# Patient Record
Sex: MALE | Race: WHITE | NOT HISPANIC OR LATINO | URBAN - METROPOLITAN AREA
[De-identification: names, ages, dates, MRNs, and addresses within clinical notes are randomized per-mention and may not be internally consistent; named-entity substitution may affect disease eponyms.]

---

## 2019-06-26 ENCOUNTER — EMERGENCY (EMERGENCY)
Facility: HOSPITAL | Age: 69
LOS: 1 days | Discharge: HOME | End: 2019-06-26
Admitting: EMERGENCY MEDICINE
Payer: MEDICARE

## 2019-06-26 VITALS
HEART RATE: 64 BPM | SYSTOLIC BLOOD PRESSURE: 163 MMHG | WEIGHT: 225.09 LBS | TEMPERATURE: 98 F | OXYGEN SATURATION: 99 % | RESPIRATION RATE: 20 BRPM | DIASTOLIC BLOOD PRESSURE: 99 MMHG

## 2019-06-26 VITALS
SYSTOLIC BLOOD PRESSURE: 125 MMHG | DIASTOLIC BLOOD PRESSURE: 74 MMHG | RESPIRATION RATE: 18 BRPM | OXYGEN SATURATION: 98 % | HEART RATE: 62 BPM | TEMPERATURE: 98 F

## 2019-06-26 DIAGNOSIS — S86.011A STRAIN OF RIGHT ACHILLES TENDON, INITIAL ENCOUNTER: ICD-10-CM
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
## 2019-06-26 DIAGNOSIS — M25.571 PAIN IN RIGHT ANKLE AND JOINTS OF RIGHT FOOT: ICD-10-CM
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
## 2019-06-26 PROBLEM — Z00.00 ENCOUNTER FOR PREVENTIVE HEALTH EXAMINATION: Status: ACTIVE | Noted: 2019-06-26

## 2019-06-26 PROCEDURE — 99283 EMERGENCY DEPT VISIT LOW MDM: CPT

## 2019-06-26 PROCEDURE — 73610 X-RAY EXAM OF ANKLE: CPT | Mod: 26,RT

## 2019-06-26 PROCEDURE — 99284 EMERGENCY DEPT VISIT MOD MDM: CPT

## 2019-06-26 NOTE — ED PROVIDER NOTE - MUSCULOSKELETAL MINIMAL EXAM
R ankle:  no midfoot or malleolar TTP, no ecchymosis, no edema, no deformity.  + TTP to achilles, 2+ pedal pulses.  brisk cap refill.  + Persaud test.

## 2019-06-26 NOTE — ED PROVIDER NOTE - CARE PROVIDER_API CALL
Camron Royal)  [facility]  Orthopedics  [address], [address] [place] [address]  Phone: [phone]  Fax: [phone]  Follow Up Time:

## 2019-06-26 NOTE — ED PROVIDER NOTE - OBJECTIVE STATEMENT
69 y/o M presents to ED c/o R ankle pain near his achilles.  He states a rat was startled by the crowd and jumped off his leg causing the patient to jump up.  The patient hear a loud pop and pain once he jumped up.  He denies numbness/tingling, prior injury or other concerns.

## 2019-06-26 NOTE — ED PROVIDER NOTE - CLINICAL SUMMARY MEDICAL DECISION MAKING FREE TEXT BOX
67 y/o M presents to ED c/o L achilles pain.  xray shows avulsion fracture vs calcified tendon.  Exam positive Persaud's test consistent with rupture achilles.  Dr. Royal consulted at bs.  Pt discharged and wheeled to office for wedge boot and ortho instructions.  Pt amenable.

## 2019-06-26 NOTE — ED ADULT TRIAGE NOTE - CHIEF COMPLAINT QUOTE
Patient is complaining of right ankle/pain swelling [place] [employer] [place] on him; patient states he felt a sudden sharp pain in his ankle, and heard a loud "POP" noise; patient used cold compresses and elevated last night with no relief

## 2019-06-26 NOTE — ED ADULT NURSE NOTE - CHPI ED NUR SYMPTOMS NEG
no chills/no nausea/no fever/no tingling/no vomiting/no weakness/no decreased eating/drinking/no dizziness/no pain

## 2019-06-26 NOTE — CONSULT NOTE ADULT - SUBJECTIVE AND OBJECTIVE BOX
HPI: 68-year-old male with right lower extremity swelling and pain and inability to plantarflex. [place] and [place] away from a rodent and felt a pop in his right lower extremity like he was struck by a baseball bat. He was unable to weight-bear and was brought into the emergency room for evaluation. He denies any head trauma loss of consciousness.    Past medical history: Coronary artery disease status post 2 stents.  Past surgical history: Coronary stents, orthopedic shoulder surgery  Meds: Aspirin,  Allergies: No known drug allergies  Social: Denies alcohol, [no] ilicits, [no] tobacco use  Family History: No history of adverse reactions to anesthesia, bleeding disorders, or fragility fractures    Review of Systems no feelings of weakness    All other review of systems are negative except as noted.   Constitutional: no chills, not feeling tired and no fever.   Eyes: no discharge, no pain and no redness.   ENT: no nasal discharge, no nosebleeds and no sore throat.   Cardiovascular: no chest pain, no palpitations and the heart rate was not fast.   Respiratory: no shortness of breath, no cough and no wheezing.   Gastrointestinal: no abdominal pain, no diarrhea, no vomiting and no melena.   Genitourinary: no pelvic pain, no dysuria, no abnormal urethral discharge and no frequency.   Integumentary: no skin lesions and no change in a mole.   Neurological: no dizziness, no fainting and no convulsions.   Endocrine: no deepening of the voice and no hot flashes.   Hematologic/Lymphatic: does not bleed easily, no swollen glands and no cervical lymphadenopathy.   Musculoskeletal: [as per HPI, otherwise denies additional joint pain, effusions, stiffness.]       Physical exam      General: Patient is awake and alert, demonstrates appropriate mood and affect, exhibits normal breathing and is in no acute distress.  Psych: The patient is appropriately dressed and groomed, maintains good eye contact. Alert and oriented x 3. Normal attention/concentration, fund of knowledge and recall. Normal speech rate and rhythm. No hallucinations, suicidal or homicidal ideations. Demonstrates expected level of insight and judgment regarding health.  Skin: The patient has no chronic skin lesions, rashes, or ulcers. There is no induration or erythema of uninvolved extremities. For skin exam of involved extremity refer to detailed musculoskeletal/extremity exam.   Cardiovascular: No visible jugular venous distention. There is brisk capillary refill in the digits of the affected extremity. They are symmetric pulses in the bilateral upper and lower extremities.   Respiratory: The patient is in no apparent respiratory distress. They're taking full deep breaths with normal excursion, without use of accessory muscles or evidence of audible wheezes or stridor without the use of a stethoscope.   Neurological: 5/5 motor strength and sensation intact throughout uninvolved upper and lower extremities, refer to detailed musculoskeletal exam regarding involved extremity.  Neck: Full range of motion with flexion, extension, rotation, and side bending; no palpable crepitus, normal alignment and lordosis, symmetric appearance, midline trachea, no thyroid hypertrophy or nodules    Right lower extremity  Palpable defect about the right Achilles tendon approximately 5 cm proximal to insertion.  Loss of normal plantar flexion cascade compared to the contralateral side  Severe tenderness to palpation about the Achilles tendon substance  Moderate to mild pain about the Achilles tendon insertion  Palpable bony prominence about the Achilles tendon insertion  No skin defects or blanching  Unable to plantarflex secondary to pain against resistance  Appearance: Skin is intact. There is no skin breakdown. There were no lesions. There is no erythema.  Vascular: The patient has a warm and well-perfused foot. There is a palpable 2+ dorsalis pedis and posterior tibialis artery. There is brisk capillary refill to all 5 digits.  Sensation intact to light touch in saphenous, sural, superficial peroneal, deep peroneal, and tibial nerve distributions.   Motors: Patient is able to fire extensor hallucis longus, flexor hallucis longus, tibialis anterior, gastrocsoleus complex. The patient has 5 out of 5 strength in all myotomes.  Lymph: No evidence of venous stasis ulcers. No lymphedema. No pitting edema.       Imaging:  Nonweightbearing Right ankle x-rays show calcification of the Achilles tendon substance as well as the mild to moderate sized calcification of the insertion of the Achilles tendon. 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    Assessment and plan  68-year-old male with right Achilles tendon midsubstance rupture. The patient has calcifications of his Achilles tendon on radiographs. Patient denies previous prodromal symptoms of Achilles tendinitis. The patient denies being a highly active individual. He does not perform any running or jumping activities. I lengthy discussion with the patient and his wife regarding the patient's pathology. Due to be calcifications on his x-ray, I would recommend an MRI to evaluate the degree of tendinosis about the right Achilles tendon.   #1 Follow up Friday, June 28, 2019 for an MRI of his right ankle. No images at follow up  #2 nonweightbearing right lower extremity plantarflexed Cam Walker boot  #3 ice and elevate  #4 medical clearance pending  #5 I explained the risks benefits and alternatives to surgery 2 the patient. We will discuss these further in the office on Friday.

## 2019-06-28 ENCOUNTER — TRANSCRIPTION ENCOUNTER (OUTPATIENT)
Age: 69
End: 2019-06-28

## 2019-06-28 ENCOUNTER — APPOINTMENT (OUTPATIENT)
Dept: MRI IMAGING | Facility: CLINIC | Age: 69
End: 2019-06-28
Payer: MEDICARE

## 2019-06-28 ENCOUNTER — APPOINTMENT (OUTPATIENT)
Dept: ORTHOPEDIC SURGERY | Facility: CLINIC | Age: 69
End: 2019-06-28

## 2019-06-28 ENCOUNTER — OUTPATIENT (OUTPATIENT)
Dept: OUTPATIENT SERVICES | Facility: HOSPITAL | Age: 69
LOS: 1 days | End: 2019-06-28

## 2019-06-28 DIAGNOSIS — S86.011A STRAIN OF RIGHT ACHILLES TENDON, INITIAL ENCOUNTER: ICD-10-CM

## 2019-06-28 PROBLEM — I25.10 ATHEROSCLEROTIC HEART DISEASE OF NATIVE CORONARY ARTERY WITHOUT ANGINA PECTORIS: Chronic | Status: ACTIVE | Noted: 2019-06-26

## 2019-06-28 PROCEDURE — 73721 MRI JNT OF LWR EXTRE W/O DYE: CPT | Mod: 26,RT

## 2019-07-01 ENCOUNTER — OTHER (OUTPATIENT)
Age: 69
End: 2019-07-01